# Patient Record
Sex: FEMALE | Race: WHITE | NOT HISPANIC OR LATINO
[De-identification: names, ages, dates, MRNs, and addresses within clinical notes are randomized per-mention and may not be internally consistent; named-entity substitution may affect disease eponyms.]

---

## 2024-06-11 ENCOUNTER — DASHBOARD ENCOUNTERS (OUTPATIENT)
Age: 39
End: 2024-06-11

## 2024-06-11 ENCOUNTER — OFFICE VISIT (OUTPATIENT)
Dept: URBAN - METROPOLITAN AREA CLINIC 2 | Facility: CLINIC | Age: 39
End: 2024-06-11
Payer: COMMERCIAL

## 2024-06-11 VITALS
HEART RATE: 77 BPM | DIASTOLIC BLOOD PRESSURE: 88 MMHG | SYSTOLIC BLOOD PRESSURE: 121 MMHG | BODY MASS INDEX: 23.77 KG/M2 | WEIGHT: 151.8 LBS | TEMPERATURE: 97.1 F

## 2024-06-11 DIAGNOSIS — K64.5 THROMBOSED EXTERNAL HEMORRHOID: ICD-10-CM

## 2024-06-11 PROCEDURE — 99203 OFFICE O/P NEW LOW 30 MIN: CPT | Performed by: NURSE PRACTITIONER

## 2024-06-11 RX ORDER — HYDROCORTISONE 25 MG/G
1 APPLICATION CREAM TOPICAL TWICE A DAY
Qty: 30 GRAM | Refills: 1 | OUTPATIENT
Start: 2024-06-11 | End: 2024-07-09

## 2024-06-11 NOTE — HPI-TODAY'S VISIT:
Very pleasant 39-year-old female seen today for complaints of painful hemorrhoids.  Patient has a history of constipation and hemorrhoids that have flared intermittently since she had children.  However, in the past week hemorrhoid has been severe.  This happened after having a bout of diarrhea.  She has been using Preparation H with no improvement.  She went to see primary care and was given suppository and compounding cream.  The compounding cream will not arrive until next week.  So patient was referred to see GI. She is taking stool softeners. c/o blood with BM worse when she wipes.